# Patient Record
Sex: FEMALE | Race: WHITE | Employment: OTHER | URBAN - METROPOLITAN AREA
[De-identification: names, ages, dates, MRNs, and addresses within clinical notes are randomized per-mention and may not be internally consistent; named-entity substitution may affect disease eponyms.]

---

## 2023-02-19 ENCOUNTER — HOSPITAL ENCOUNTER (INPATIENT)
Age: 88
LOS: 2 days | Discharge: NURSING, REHAB OR LONG-TERM CARE | DRG: 389 | End: 2023-02-21
Attending: STUDENT IN AN ORGANIZED HEALTH CARE EDUCATION/TRAINING PROGRAM | Admitting: STUDENT IN AN ORGANIZED HEALTH CARE EDUCATION/TRAINING PROGRAM
Payer: MEDICARE

## 2023-02-19 ENCOUNTER — APPOINTMENT (OUTPATIENT)
Dept: CT IMAGING | Age: 88
DRG: 389 | End: 2023-02-19
Payer: MEDICARE

## 2023-02-19 DIAGNOSIS — K59.00 CONSTIPATION, UNSPECIFIED CONSTIPATION TYPE: ICD-10-CM

## 2023-02-19 DIAGNOSIS — R33.9 URINARY RETENTION: ICD-10-CM

## 2023-02-19 DIAGNOSIS — N17.9 AKI (ACUTE KIDNEY INJURY) (HCC): Primary | ICD-10-CM

## 2023-02-19 LAB
A/G RATIO: 1 (ref 1.1–2.2)
ALBUMIN SERPL-MCNC: 3.4 G/DL (ref 3.4–5)
ALP BLD-CCNC: 95 U/L (ref 40–129)
ALT SERPL-CCNC: 31 U/L (ref 10–40)
ANION GAP SERPL CALCULATED.3IONS-SCNC: 11 MMOL/L (ref 3–16)
AST SERPL-CCNC: 22 U/L (ref 15–37)
BASOPHILS ABSOLUTE: 0 K/UL (ref 0–0.2)
BASOPHILS RELATIVE PERCENT: 0.4 %
BILIRUB SERPL-MCNC: 0.4 MG/DL (ref 0–1)
BILIRUBIN URINE: NEGATIVE
BLOOD, URINE: NEGATIVE
BUN BLDV-MCNC: 41 MG/DL (ref 7–20)
CALCIUM SERPL-MCNC: 9 MG/DL (ref 8.3–10.6)
CHLORIDE BLD-SCNC: 100 MMOL/L (ref 99–110)
CLARITY: CLEAR
CO2: 26 MMOL/L (ref 21–32)
COLOR: YELLOW
CREAT SERPL-MCNC: 2 MG/DL (ref 0.6–1.2)
EOSINOPHILS ABSOLUTE: 0.1 K/UL (ref 0–0.6)
EOSINOPHILS RELATIVE PERCENT: 0.6 %
GFR SERPL CREATININE-BSD FRML MDRD: 24 ML/MIN/{1.73_M2}
GLUCOSE BLD-MCNC: 145 MG/DL (ref 70–99)
GLUCOSE URINE: NEGATIVE MG/DL
HCT VFR BLD CALC: 31.5 % (ref 36–48)
HEMOGLOBIN: 10.6 G/DL (ref 12–16)
KETONES, URINE: NEGATIVE MG/DL
LACTIC ACID: 1.3 MMOL/L (ref 0.4–2)
LEUKOCYTE ESTERASE, URINE: NEGATIVE
LYMPHOCYTES ABSOLUTE: 1 K/UL (ref 1–5.1)
LYMPHOCYTES RELATIVE PERCENT: 9.5 %
MCH RBC QN AUTO: 30.2 PG (ref 26–34)
MCHC RBC AUTO-ENTMCNC: 33.7 G/DL (ref 31–36)
MCV RBC AUTO: 89.8 FL (ref 80–100)
MICROSCOPIC EXAMINATION: NORMAL
MONOCYTES ABSOLUTE: 1.1 K/UL (ref 0–1.3)
MONOCYTES RELATIVE PERCENT: 10.8 %
NEUTROPHILS ABSOLUTE: 8.1 K/UL (ref 1.7–7.7)
NEUTROPHILS RELATIVE PERCENT: 78.7 %
NITRITE, URINE: NEGATIVE
PDW BLD-RTO: 14.9 % (ref 12.4–15.4)
PH UA: 6 (ref 5–8)
PLATELET # BLD: 244 K/UL (ref 135–450)
PMV BLD AUTO: 8.1 FL (ref 5–10.5)
POTASSIUM REFLEX MAGNESIUM: 4.6 MMOL/L (ref 3.5–5.1)
PROTEIN UA: NEGATIVE MG/DL
RBC # BLD: 3.51 M/UL (ref 4–5.2)
SODIUM BLD-SCNC: 137 MMOL/L (ref 136–145)
SPECIFIC GRAVITY UA: 1.02 (ref 1–1.03)
TOTAL PROTEIN: 6.7 G/DL (ref 6.4–8.2)
URINE REFLEX TO CULTURE: NORMAL
URINE TYPE: NORMAL
UROBILINOGEN, URINE: 0.2 E.U./DL
WBC # BLD: 10.4 K/UL (ref 4–11)

## 2023-02-19 PROCEDURE — 80053 COMPREHEN METABOLIC PANEL: CPT

## 2023-02-19 PROCEDURE — 2580000003 HC RX 258: Performed by: STUDENT IN AN ORGANIZED HEALTH CARE EDUCATION/TRAINING PROGRAM

## 2023-02-19 PROCEDURE — 83605 ASSAY OF LACTIC ACID: CPT

## 2023-02-19 PROCEDURE — 2700000000 HC OXYGEN THERAPY PER DAY

## 2023-02-19 PROCEDURE — 94761 N-INVAS EAR/PLS OXIMETRY MLT: CPT

## 2023-02-19 PROCEDURE — 99285 EMERGENCY DEPT VISIT HI MDM: CPT

## 2023-02-19 PROCEDURE — 85025 COMPLETE CBC W/AUTO DIFF WBC: CPT

## 2023-02-19 PROCEDURE — 81003 URINALYSIS AUTO W/O SCOPE: CPT

## 2023-02-19 PROCEDURE — 36415 COLL VENOUS BLD VENIPUNCTURE: CPT

## 2023-02-19 PROCEDURE — 1200000000 HC SEMI PRIVATE

## 2023-02-19 PROCEDURE — 74176 CT ABD & PELVIS W/O CONTRAST: CPT

## 2023-02-19 PROCEDURE — 6370000000 HC RX 637 (ALT 250 FOR IP): Performed by: STUDENT IN AN ORGANIZED HEALTH CARE EDUCATION/TRAINING PROGRAM

## 2023-02-19 RX ORDER — SENNA AND DOCUSATE SODIUM 50; 8.6 MG/1; MG/1
2 TABLET, FILM COATED ORAL DAILY
Status: DISCONTINUED | OUTPATIENT
Start: 2023-02-19 | End: 2023-02-21 | Stop reason: HOSPADM

## 2023-02-19 RX ORDER — LANOLIN ALCOHOL/MO/W.PET/CERES
3 CREAM (GRAM) TOPICAL NIGHTLY
Status: DISCONTINUED | OUTPATIENT
Start: 2023-02-19 | End: 2023-02-21 | Stop reason: HOSPADM

## 2023-02-19 RX ORDER — SENNOSIDES 8.6 MG
1 TABLET ORAL DAILY PRN
COMMUNITY

## 2023-02-19 RX ORDER — NYSTATIN 100000 [USP'U]/G
POWDER TOPICAL 2 TIMES DAILY PRN
COMMUNITY

## 2023-02-19 RX ORDER — SODIUM CHLORIDE 0.9 % (FLUSH) 0.9 %
5-40 SYRINGE (ML) INJECTION EVERY 12 HOURS SCHEDULED
Status: DISCONTINUED | OUTPATIENT
Start: 2023-02-19 | End: 2023-02-21 | Stop reason: HOSPADM

## 2023-02-19 RX ORDER — FUROSEMIDE 40 MG/1
40 TABLET ORAL DAILY
COMMUNITY

## 2023-02-19 RX ORDER — SODIUM CHLORIDE 9 MG/ML
INJECTION, SOLUTION INTRAVENOUS PRN
Status: DISCONTINUED | OUTPATIENT
Start: 2023-02-19 | End: 2023-02-21 | Stop reason: HOSPADM

## 2023-02-19 RX ORDER — EZETIMIBE 10 MG/1
10 TABLET ORAL DAILY
Status: DISCONTINUED | OUTPATIENT
Start: 2023-02-20 | End: 2023-02-21 | Stop reason: HOSPADM

## 2023-02-19 RX ORDER — LISINOPRIL 40 MG/1
40 TABLET ORAL DAILY
Status: CANCELLED | OUTPATIENT
Start: 2023-02-19

## 2023-02-19 RX ORDER — ONDANSETRON 4 MG/1
4 TABLET, ORALLY DISINTEGRATING ORAL EVERY 8 HOURS PRN
Status: DISCONTINUED | OUTPATIENT
Start: 2023-02-19 | End: 2023-02-21 | Stop reason: HOSPADM

## 2023-02-19 RX ORDER — SODIUM CHLORIDE 0.9 % (FLUSH) 0.9 %
5-40 SYRINGE (ML) INJECTION PRN
Status: DISCONTINUED | OUTPATIENT
Start: 2023-02-19 | End: 2023-02-21 | Stop reason: HOSPADM

## 2023-02-19 RX ORDER — LISINOPRIL 40 MG/1
40 TABLET ORAL DAILY
COMMUNITY

## 2023-02-19 RX ORDER — SERTRALINE HYDROCHLORIDE 25 MG/1
25 TABLET, FILM COATED ORAL DAILY
COMMUNITY

## 2023-02-19 RX ORDER — LORAZEPAM 0.5 MG/1
0.5 TABLET ORAL EVERY 8 HOURS PRN
COMMUNITY

## 2023-02-19 RX ORDER — LEVOTHYROXINE SODIUM 0.05 MG/1
50 TABLET ORAL DAILY
COMMUNITY

## 2023-02-19 RX ORDER — HYDROXYZINE HYDROCHLORIDE 25 MG/1
25 TABLET, FILM COATED ORAL 3 TIMES DAILY PRN
COMMUNITY

## 2023-02-19 RX ORDER — LEVOTHYROXINE SODIUM 0.05 MG/1
50 TABLET ORAL DAILY
Status: DISCONTINUED | OUTPATIENT
Start: 2023-02-20 | End: 2023-02-21 | Stop reason: HOSPADM

## 2023-02-19 RX ORDER — EZETIMIBE 10 MG/1
10 TABLET ORAL DAILY
COMMUNITY

## 2023-02-19 RX ORDER — ASPIRIN 81 MG/1
81 TABLET ORAL DAILY
COMMUNITY

## 2023-02-19 RX ORDER — ENOXAPARIN SODIUM 100 MG/ML
30 INJECTION SUBCUTANEOUS DAILY
Status: DISCONTINUED | OUTPATIENT
Start: 2023-02-19 | End: 2023-02-21 | Stop reason: HOSPADM

## 2023-02-19 RX ORDER — LOPERAMIDE HYDROCHLORIDE 2 MG/1
4 CAPSULE ORAL PRN
COMMUNITY

## 2023-02-19 RX ORDER — LORAZEPAM 0.5 MG/1
0.5 TABLET ORAL EVERY 8 HOURS PRN
Status: DISCONTINUED | OUTPATIENT
Start: 2023-02-19 | End: 2023-02-21 | Stop reason: HOSPADM

## 2023-02-19 RX ORDER — ASPIRIN 81 MG/1
81 TABLET ORAL DAILY
Status: DISCONTINUED | OUTPATIENT
Start: 2023-02-20 | End: 2023-02-21 | Stop reason: HOSPADM

## 2023-02-19 RX ORDER — ACETAMINOPHEN 325 MG/1
650 TABLET ORAL EVERY 6 HOURS PRN
Status: DISCONTINUED | OUTPATIENT
Start: 2023-02-19 | End: 2023-02-21 | Stop reason: HOSPADM

## 2023-02-19 RX ORDER — ONDANSETRON 4 MG/1
4 TABLET, FILM COATED ORAL EVERY 8 HOURS PRN
COMMUNITY

## 2023-02-19 RX ORDER — ACETAMINOPHEN 650 MG/1
650 SUPPOSITORY RECTAL EVERY 6 HOURS PRN
Status: DISCONTINUED | OUTPATIENT
Start: 2023-02-19 | End: 2023-02-21 | Stop reason: HOSPADM

## 2023-02-19 RX ORDER — CARVEDILOL 25 MG/1
25 TABLET ORAL 2 TIMES DAILY WITH MEALS
COMMUNITY

## 2023-02-19 RX ORDER — CARVEDILOL 25 MG/1
25 TABLET ORAL 2 TIMES DAILY WITH MEALS
Status: DISCONTINUED | OUTPATIENT
Start: 2023-02-19 | End: 2023-02-21 | Stop reason: HOSPADM

## 2023-02-19 RX ORDER — SODIUM PHOSPHATE, DIBASIC AND SODIUM PHOSPHATE, MONOBASIC 7; 19 G/133ML; G/133ML
1 ENEMA RECTAL PRN
COMMUNITY

## 2023-02-19 RX ORDER — NITROGLYCERIN 0.4 MG/1
0.4 TABLET SUBLINGUAL EVERY 5 MIN PRN
COMMUNITY

## 2023-02-19 RX ORDER — SERTRALINE HYDROCHLORIDE 25 MG/1
25 TABLET, FILM COATED ORAL DAILY
Status: DISCONTINUED | OUTPATIENT
Start: 2023-02-20 | End: 2023-02-21 | Stop reason: HOSPADM

## 2023-02-19 RX ORDER — IRON POLYSACCHARIDE COMPLEX 150 MG
150 CAPSULE ORAL DAILY
Status: DISCONTINUED | OUTPATIENT
Start: 2023-02-20 | End: 2023-02-21 | Stop reason: HOSPADM

## 2023-02-19 RX ORDER — NYSTATIN 100000 U/G
CREAM TOPICAL PRN
COMMUNITY

## 2023-02-19 RX ORDER — MAG HYDROX/ALUMINUM HYD/SIMETH 400-400-40
1 SUSPENSION, ORAL (FINAL DOSE FORM) ORAL PRN
COMMUNITY

## 2023-02-19 RX ORDER — POLYETHYLENE GLYCOL 3350 17 G/17G
17 POWDER, FOR SOLUTION ORAL EVERY OTHER DAY
COMMUNITY

## 2023-02-19 RX ORDER — POLYETHYLENE GLYCOL 3350 17 G/17G
17 POWDER, FOR SOLUTION ORAL 2 TIMES DAILY
Status: DISCONTINUED | OUTPATIENT
Start: 2023-02-19 | End: 2023-02-21 | Stop reason: HOSPADM

## 2023-02-19 RX ORDER — MELOXICAM 7.5 MG/1
7.5 TABLET ORAL DAILY
Status: ON HOLD | COMMUNITY
End: 2023-02-21 | Stop reason: HOSPADM

## 2023-02-19 RX ORDER — SODIUM CHLORIDE, SODIUM LACTATE, POTASSIUM CHLORIDE, AND CALCIUM CHLORIDE .6; .31; .03; .02 G/100ML; G/100ML; G/100ML; G/100ML
500 INJECTION, SOLUTION INTRAVENOUS ONCE
Status: COMPLETED | OUTPATIENT
Start: 2023-02-19 | End: 2023-02-19

## 2023-02-19 RX ORDER — BISACODYL 10 MG
10 SUPPOSITORY, RECTAL RECTAL DAILY PRN
Status: DISCONTINUED | OUTPATIENT
Start: 2023-02-19 | End: 2023-02-21 | Stop reason: HOSPADM

## 2023-02-19 RX ORDER — SPIRONOLACTONE 25 MG/1
12.5 TABLET ORAL DAILY
COMMUNITY

## 2023-02-19 RX ORDER — LANOLIN ALCOHOL/MO/W.PET/CERES
3 CREAM (GRAM) TOPICAL NIGHTLY
COMMUNITY

## 2023-02-19 RX ORDER — ONDANSETRON 2 MG/ML
4 INJECTION INTRAMUSCULAR; INTRAVENOUS EVERY 6 HOURS PRN
Status: DISCONTINUED | OUTPATIENT
Start: 2023-02-19 | End: 2023-02-21 | Stop reason: HOSPADM

## 2023-02-19 RX ORDER — IRON POLYSACCHARIDE COMPLEX 150 MG
150 CAPSULE ORAL DAILY
COMMUNITY

## 2023-02-19 RX ORDER — TRIAMCINOLONE ACETONIDE 1 MG/G
CREAM TOPICAL 2 TIMES DAILY PRN
COMMUNITY

## 2023-02-19 RX ADMIN — CARVEDILOL 25 MG: 25 TABLET, FILM COATED ORAL at 21:18

## 2023-02-19 RX ADMIN — SODIUM CHLORIDE, POTASSIUM CHLORIDE, SODIUM LACTATE AND CALCIUM CHLORIDE 500 ML: 600; 310; 30; 20 INJECTION, SOLUTION INTRAVENOUS at 17:53

## 2023-02-19 RX ADMIN — ACETAMINOPHEN 650 MG: 325 TABLET ORAL at 21:18

## 2023-02-19 RX ADMIN — Medication 3 MG: at 21:18

## 2023-02-19 ASSESSMENT — LIFESTYLE VARIABLES
HOW MANY STANDARD DRINKS CONTAINING ALCOHOL DO YOU HAVE ON A TYPICAL DAY: PATIENT DOES NOT DRINK
HOW OFTEN DO YOU HAVE A DRINK CONTAINING ALCOHOL: NEVER

## 2023-02-19 ASSESSMENT — ENCOUNTER SYMPTOMS
VOMITING: 0
DIARRHEA: 0
COUGH: 0
SHORTNESS OF BREATH: 0
SORE THROAT: 0
ABDOMINAL PAIN: 1
NAUSEA: 0
RECTAL PAIN: 1
CONSTIPATION: 1

## 2023-02-19 ASSESSMENT — PAIN DESCRIPTION - LOCATION: LOCATION: BUTTOCKS

## 2023-02-19 ASSESSMENT — PAIN SCALES - GENERAL: PAINLEVEL_OUTOF10: 2

## 2023-02-19 ASSESSMENT — PAIN - FUNCTIONAL ASSESSMENT: PAIN_FUNCTIONAL_ASSESSMENT: NONE - DENIES PAIN

## 2023-02-19 NOTE — H&P
Hospital Medicine History & Physical      PCP: Sarah Kerns MD    Date of Admission: 2/19/2023    Date of Service: Pt seen/examined on 02/19/2023 and Admitted to Inpatient with expected LOS greater than two midnights due to medical therapy. Chief Complaint:  Constipation      History Of Present Illness:      81 y.o. female w/ PMH of dementia, HTN resides at [address] [place] [address] who presented to [facility] with constipation. Per conversation with ED staff her [facility] reports she has not had BM in several days. Now complaining of pain in lower abdomen/back region and rectal pressure. Having small amount of liquid BM only for last few days. She denies any chest pain, trouble breathing, nausea, vomiting, fevers, chills or urinary symptoms. In the ED she was hypertensive to 180s/60s other vitals stable. Abdomen mildly tender but soft. Labs primarily remarkable for Cr of 2 (unknown baseline). Straight cath obtained for urine w/ ~600 cc output: no sign of UTI. CT abdomen obtained with no acute obstruction but does show concern for possible rectal impaction. Given IVF, soap-suds enema and admitted for further care. Past Medical History:          Diagnosis Date    Anxiety     Dementia ([facility] [address]     Hypertension     Insomnia     Osteoarthritis        Past Surgical History:      No past surgical history on file. Medications Prior to Admission:      Prior to Admission medications    Not on File       Allergies:  Escitalopram oxalate, Gabapentin, and Statins    Social History:      The patient currently lives at [address] [place] [address] Drive:   has no history on file for tobacco use. ETOH:   has no history on file for alcohol use. Family History:       Reviewed and negative in regards to presenting illness/complaint. No family history on file. REVIEW OF SYSTEMS COMPLETED:   Pertinent positives as noted in the HPI. All other systems reviewed and negative.     PHYSICAL EXAM PERFORMED:    BP (!) 180/61   Pulse 73   Temp 98.1 °F (36.7 °C) (Oral)   Resp 16   Ht 5' 1\" (1.549 m)   Wt 136 lb 6.4 oz (61.9 kg)   SpO2 94%   BMI 25.77 kg/m²     General appearance:  Laying in bed, awake, conversant, anxious but not ill-appearing  HEENT:  Normal cephalic, atraumatic without obvious deformity. Pupils equal, round, and reactive to light. Extra ocular muscles intact. Conjunctivae/corneas clear. Neck: Supple, with full range of motion. No jugular venous distention. Trachea midline. Respiratory:  Normal respiratory effort. Clear to auscultation, bilaterally without Rales/Wheezes/Rhonchi. Cardiovascular:  Regular rate and rhythm with normal S1/S2 without murmurs, rubs or gallops. Abdomen: Full but soft, no focal tenderness, bowel sound present  Musculoskeletal:  No clubbing, cyanosis or edema bilaterally. Full range of motion without deformity. Skin: Skin color, texture, turgor normal.  No rashes or lesions. Neurologic:  Neurovascularly intact without any focal sensory/motor deficits. Cranial nerves: II-XII intact, grossly non-focal.  Psychiatric:  Alert and oriented, thought content appropriate, normal insight  Capillary Refill: Brisk,3 seconds, normal  Peripheral Pulses: +2 palpable, equal bilaterally       Labs:     Recent Labs     02/19/23  1529   WBC 10.4   HGB 10.6*   HCT 31.5*        Recent Labs     02/19/23  1529      K 4.6      CO2 26   BUN 41*   CREATININE 2.0*   CALCIUM 9.0     Recent Labs     02/19/23  1529   AST 22   ALT 31   BILITOT 0.4   ALKPHOS 95     No results for input(s): INR in the last 72 hours. No results for input(s): Tyree Gubler in the last 72 hours.     Urinalysis:      Lab Results   Component Value Date/Time    NITRU Negative 02/19/2023 05:42 PM    WBCUA 3 12/14/2020 05:00 PM    RBCUA 2 12/14/2020 05:00 PM    BLOODU Negative 02/19/2023 05:42 PM    SPECGRAV 1.020 02/19/2023 05:42 PM    GLUCOSEU Negative 02/19/2023 05:42 PM       Radiology:       CT ABDOMEN PELVIS WO CONTRAST Additional Contrast? None   Final Result      1. No evidence of bowel obstruction. 2. Moderate rectal bowel contents which could indicate impaction. 3. Small right pleural effusion. 4. Atherosclerotic disease. US RENAL COMPLETE    (Results Pending)       Consults:    IP CONSULT TO HOSPITALIST    ASSESSMENT:    Active Hospital Problems    Diagnosis Date Noted    ESA (acute kidney injury) ([facility] [address] [N17.9] 02/19/2023     Priority: Medium   --Etiology unknown at this time but likely either pre-renal due to dehydration or obstructive 2/2 constipation and urinary retention  #Constipation w/ concern for fecal impaction  --Etiology likely 2/2 medication as patient apparently has multiple medications on home list that can cause constipation: loperamide, Atarax. Also could be 2/2 voluntary holding due to hemorrhoids  #HTN  #Dementia  #Anxiety  #Arthritis  #Hypothyroid  #Chronic diastolic heart failure not in acute exacerbation    PLAN:  - Admit to medical floor  - Telemetry monitoring  - IVF overnight  - Obtain urine studies  - Trend RFP tomorrow  - Obtain Renal ultrasound to eval for hydronephrosis  - Consider Nephro consult if Cr not improving by tomorrow  - Aggressive bowel care to aid in passage of stool burden  - Avoid opiates or other medications that can slow GI tract if possible  - Straight cath as needed if continues to have urinary retention  - Continue home medications as able: consult pharmacy for aid in home med rec  --Hold home Lasix, lisinopril and Aldactone pending Cr improvement  --Continue other home meds as able: Coreg, ASA, Zetia, melatonin, sertraline    DVT Prophylaxis: Lovenox  Diet: ADULT DIET; Regular  Code Status: DNR-CCA    PT/OT Eval Status: as needed    Dispo - Pending resolution of constipation and improvement in ESA. Anticipate at least 2 days. Yaneth Cooney MD    Thank you Lavaun Moritz, MD for the opportunity to be involved in this patient's care.  If you have any questions or concerns please feel free to contact me at [phone].

## 2023-02-19 NOTE — ED NOTES
ED TO INPATIENT SBAR HANDOFF    Patient Name: Nalini Camp   :  1935  81 y.o. MRN:  [id number]  Preferred Name  marcelino   ED Room #:  K32/D66-70  Family/Caregiver Present no   Restraints no   Sitter no   Sepsis Risk Score Sepsis Risk Score: 0.82    Situation  Code Status: Full Code No additional code details. Allergies: Escitalopram oxalate, Gabapentin, and Statins  Weight: Patient Vitals for the past 96 hrs (Last 3 readings):   Weight   23 1503 136 lb 6.4 oz (61.9 kg)     Arrived from: nursing home  Chief Complaint:   Chief Complaint   Patient presents with    Constipation     Pt present to ED from assisted living. Per ems staff report several days without regular BM. Concerns for obstruction. Hospital Problem/Diagnosis:  Principal Problem:    ESA (acute kidney injury) ([facility]  Resolved Problems:    * No resolved hospital problems. *    Imaging:   CT ABDOMEN PELVIS WO CONTRAST Additional Contrast? None   Final Result      1. No evidence of bowel obstruction. 2. Moderate rectal bowel contents which could indicate impaction. 3. Small right pleural effusion. 4. Atherosclerotic disease.          US RENAL COMPLETE    (Results Pending)     Abnormal labs:   Abnormal Labs Reviewed   COMPREHENSIVE METABOLIC PANEL W/ REFLEX TO MG FOR LOW K - Abnormal; Notable for the following components:       Result Value    Glucose 145 (*)     BUN 41 (*)     Creatinine 2.0 (*)     Est, Glom Filt Rate 24 (*)     Albumin/Globulin Ratio 1.0 (*)     All other components within normal limits   CBC WITH AUTO DIFFERENTIAL - Abnormal; Notable for the following components:    RBC 3.51 (*)     Hemoglobin 10.6 (*)     Hematocrit 31.5 (*)     Neutrophils Absolute 8.1 (*)     All other components within normal limits     Critical values: no     Abnormal Assessment Findings: see Notes    Background  History:   Past Medical History:   Diagnosis Date    Anxiety     Dementia ([facility] [address]     Hypertension     Insomnia Osteoarthritis        Assessment    Vitals/MEWS: MEWS Score: 1  Level of Consciousness: Alert (0)   Vitals:    02/19/23 1503 02/19/23 1830   BP: (!) 180/61 (!) 158/61   Pulse: 73 79   Resp: 16 16   Temp: 98.1 °F (36.7 °C) 98.3 °F (36.8 °C)   TempSrc: Oral Oral   SpO2: 94% 93%   Weight: 136 lb 6.4 oz (61.9 kg)    Height: 5' 1\" (1.549 m)      FiO2 (%): 93  O2 Flow Rate: O2 Device: Nasal cannula    Cardiac Rhythm:    Pain Assessment: 0 [] Verbal [] Kati Gallery Scale  Pain Scale: Pain Assessment  Pain Assessment: None - Denies Pain  Last documented pain score (0-10 scale)    Last documented pain medication administered: see mar  Mental Status: oriented and alert  NIH Score:    C-SSRS: Risk of Suicide: No Risk  Bedside swallow:    [place] Coma Scale (GCS): [place] Coma Scale  Eye Opening: Spontaneous  Best Verbal Response: Oriented  Best Motor Response: Obeys commands  [place]ail Coma Scale Score: 15  Active LDA's:   Peripheral IV 02/19/23 Left Antecubital (Active)     PO Status: Regular  Pertinent or High Risk Medications/Drips: no   If Yes, please provide details:     Pending Blood Product Administration: no     You may also review the ED PT Care Timeline found under the Summary Nursing Index tab. Recommendation    Pending orders signed and held  Plan for Discharge (if known): Additional Comments: .     If any further questions, please call Sending RN at [phone]    Electronically signed by: Electronically signed by Bryon Horton RN on 2/19/2023 at 6:43 PM      Bryon Horton RN  02/19/23 7651

## 2023-02-19 NOTE — ED PROVIDER NOTES
[facility]          ATTENDING PHYSICIAN NOTE       Date of evaluation: 2/19/2023    Chief Complaint     Constipation (Pt present to ED from assisted living. Per ems staff report several days without regular BM. Concerns for obstruction. )      History of Present Illness     Mike Khan is a 81 y.o. female with history of anxiety, HTN, dementia presenting from assisted living facility for evaluation of abdominal and rectal discomfort with decreased bowel movements with concern from facility for obstruction. Patient reports that she is having lower abdominal, back and rectal pressure and feels that she is not able to have a bowel movement but reports small amount of nonbloody diarrhea. She denies any chest pain, trouble breathing, nausea, vomiting, fevers, chills or urinary symptoms. Review of Systems     Review of Systems   Constitutional:  Negative for chills and fever. HENT:  Negative for congestion and sore throat. Eyes:  Negative for visual disturbance. Respiratory:  Negative for cough and shortness of breath. Cardiovascular:  Negative for chest pain. Gastrointestinal:  Positive for abdominal pain, constipation and rectal pain. Negative for diarrhea, nausea and vomiting. Genitourinary:  Negative for dysuria and frequency. Musculoskeletal:  Negative for arthralgias and myalgias. Skin:  Negative for rash. Neurological:  Negative for syncope. Psychiatric/Behavioral:  Negative for agitation. Past Medical, Surgical, Family, and Social History     She has a past medical history of Anxiety, Dementia ([address] Hypertension, Insomnia, and Osteoarthritis. She has no past surgical history on file. Her family history is not on file.   She     Medications     Previous Medications    ASPIRIN 81 MG EC TABLET    Take 81 mg by mouth daily    BISACODYL (DULCOLAX) 5 MG EC TABLET    Take 5 mg by mouth daily as needed for Constipation    CALCIUM CARBONATE-VITAMIN D (OYSTER SHELL CALCIUM 500 + D PO)    Take 1 tablet by mouth in the morning and at bedtime    CARVEDILOL (COREG) 25 MG TABLET    Take 25 mg by mouth 2 times daily (with meals)    EZETIMIBE (ZETIA) 10 MG TABLET    Take 10 mg by mouth daily    FUROSEMIDE (LASIX) 40 MG TABLET    Take 40 mg by mouth daily    GLYCERIN 2 G SUPPOSITORY    Place 1 suppository rectally as needed for Constipation    HYDROCORTISONE 2.5 % CREAM    Apply topically as needed As needed to affected area for hemorrhoids    HYDROXYZINE HCL (ATARAX) 25 MG TABLET    Take 25 mg by mouth 3 times daily as needed for Itching    IRON POLYSACCHARIDES (FERREX 150) 150 MG CAPSULE    Take 150 mg by mouth daily    LEVOTHYROXINE (SYNTHROID) 50 MCG TABLET    Take 50 mcg by mouth Daily    LISINOPRIL (PRINIVIL;ZESTRIL) 40 MG TABLET    Take 40 mg by mouth daily    LOPERAMIDE (IMODIUM) 2 MG CAPSULE    Take 4 mg by mouth as needed for Diarrhea    LORAZEPAM (ATIVAN) 0.5 MG TABLET    Take 0.5 mg by mouth every 8 hours as needed for Anxiety.    MELATONIN 3 MG TABS TABLET    Take 3 mg by mouth at bedtime    MELOXICAM (MOBIC) 7.5 MG TABLET    Take 7.5 mg by mouth daily    NITROGLYCERIN (NITROSTAT) 0.4 MG SL TABLET    Place 0.4 mg under the tongue every 5 minutes as needed for Chest pain up to max of 3 total doses. If no relief after 1 dose, call 911.    NYSTATIN (MYCOSTATIN) 276891 UNIT/GM CREAM    Apply topically as needed for Dry Skin    NYSTATIN 414678 UNIT/GM POWDER    Apply topically 2 times daily as needed Twice daily as needed under breasts    ONDANSETRON (ZOFRAN) 4 MG TABLET    Take 4 mg by mouth every 8 hours as needed for Nausea or Vomiting    POLYETHYLENE GLYCOL (GLYCOLAX) 17 G PACKET    Take 17 g by mouth every other day    SENNA (SENOKOT) 8.6 MG TABS TABLET    Take 1 tablet by mouth daily as needed for Constipation    SERTRALINE (ZOLOFT) 25 MG TABLET    Take 25 mg by mouth daily    SODIUM PHOSPHATE (FLEET) 7-19 GM/118ML    Place 1 enema  rectally as needed    SPIRONOLACTONE (ALDACTONE) 25 MG TABLET    Take 12.5 mg by mouth daily    TRIAMCINOLONE (KENALOG) 0.1 % CREAM    Apply topically 2 times daily as needed Apply topically 2 times daily prn to rash       Allergies     She is allergic to escitalopram oxalate, gabapentin, and statins. Physical Exam     INITIAL VITALS: BP: (!) 180/61, Temp: 98.1 °F (36.7 °C), Heart Rate: 73, Resp: 16, SpO2: 94 %   Physical Exam  GENERAL: Awake, alert. No acute distress. HEENT: Normocephalic, atraumatic. Conjunctiva clear, EOMI, no eye drainage. External ear normal. Nares patent with no drainage. Mucous membranes moist. Neck is supple, with full ROM. CARDIOVASCULAR: RRR, extremities warm and appear well-perfused. No peripheral edema  RESPIRATORY: No increased WOB, good air movement, breath sounds CTAB, no wheezes, rhonchi, or crackles noted. GI/ABDOMEN: Soft, non-distended, mild tenderness to lower abdomen, hyperactive bowel sounds, no rebound, no guarding. MSK/EXTR: No deformities or cyanosis noted. SKIN: Warm and dry, no rashes. NEURO: No focal neurologic deficits, moving all four extremities. PSYCH: Normal affect, answers questions appropriately. Diagnostic Results     EKG       RADIOLOGY:  CT ABDOMEN PELVIS WO CONTRAST Additional Contrast? None   Final Result      1. No evidence of bowel obstruction. 2. Moderate rectal bowel contents which could indicate impaction. 3. Small right pleural effusion. 4. Atherosclerotic disease.          US RENAL COMPLETE    (Results Pending)       LABS:   Results for orders placed or performed during the hospital encounter of 02/19/23   CMP w/ Reflex to MG   Result Value Ref Range    Sodium 137 136 - 145 mmol/L    Potassium reflex Magnesium 4.6 3.5 - 5.1 mmol/L    Chloride 100 99 - 110 mmol/L    CO2 26 21 - 32 mmol/L    Anion Gap 11 3 - 16    Glucose 145 (H) 70 - 99 mg/dL    BUN 41 (H) 7 - 20 mg/dL    Creatinine 2.0 (H) 0.6 - 1.2 mg/dL    Est, Glom Filt Rate 24 (A) >60    Calcium 9.0 8.3 - 10.6 mg/dL    Total Protein 6.7 6.4 - 8.2 g/dL    Albumin 3.4 3.4 - 5.0 g/dL    Albumin/Globulin Ratio 1.0 (L) 1.1 - 2.2    Total Bilirubin 0.4 0.0 - 1.0 mg/dL    Alkaline Phosphatase 95 40 - 129 U/L    ALT 31 10 - 40 U/L    AST 22 15 - 37 U/L   CBC with Auto Differential   Result Value Ref Range    WBC 10.4 4.0 - 11.0 K/uL    RBC 3.51 (L) 4.00 - 5.20 M/uL    Hemoglobin 10.6 (L) 12.0 - 16.0 g/dL    Hematocrit 31.5 (L) 36.0 - 48.0 %    MCV 89.8 80.0 - 100.0 fL    MCH 30.2 26.0 - 34.0 pg    MCHC 33.7 31.0 - 36.0 g/dL    RDW 14.9 12.4 - 15.4 %    Platelets 827 787 - 745 K/uL    MPV 8.1 5.0 - 10.5 fL    Neutrophils % 78.7 %    Lymphocytes % 9.5 %    Monocytes % 10.8 %    Eosinophils % 0.6 %    Basophils % 0.4 %    Neutrophils Absolute 8.1 (H) 1.7 - 7.7 K/uL    Lymphocytes Absolute 1.0 1.0 - 5.1 K/uL    Monocytes Absolute 1.1 0.0 - 1.3 K/uL    Eosinophils Absolute 0.1 0.0 - 0.6 K/uL    Basophils Absolute 0.0 0.0 - 0.2 K/uL   Lactic Acid   Result Value Ref Range    Lactic Acid 1.3 0.4 - 2.0 mmol/L   Urinalysis with Reflex to Culture    Specimen: Urine   Result Value Ref Range    Color, UA Yellow Straw/Yellow    Clarity, UA Clear Clear    Glucose, Ur Negative Negative mg/dL    Bilirubin Urine Negative Negative    Ketones, Urine Negative Negative mg/dL    Specific Gravity, UA 1.020 1.005 - 1.030    Blood, Urine Negative Negative    pH, UA 6.0 5.0 - 8.0    Protein, UA Negative Negative mg/dL    Urobilinogen, Urine 0.2 <2.0 E.U./dL    Nitrite, Urine Negative Negative    Leukocyte Esterase, Urine Negative Negative    Microscopic Examination Not Indicated     Urine Type NotGiven     Urine Reflex to Culture Not Indicated        ED BEDSIDE ULTRASOUND:  No results found.     RECENT VITALS:  BP: (!) 158/61,Temp: 98.3 °F (36.8 °C), Heart Rate: 79, Resp: 16, SpO2: 93 %     Procedures         ED Course     Nursing Notes, Past Medical Hx, Past Surgical Hx, Social Hx,Allergies, and Family Hx were reviewed. patient was given the following medications:  Orders Placed This Encounter   Medications    lactated ringers bolus    sodium chloride flush 0.9 % injection 5-40 mL    sodium chloride flush 0.9 % injection 5-40 mL    0.9 % sodium chloride infusion    enoxaparin Sodium (LOVENOX) injection 30 mg     Order Specific Question:   Indication of Use     Answer:   Prophylaxis-DVT/PE    OR Linked Order Group     ondansetron (ZOFRAN-ODT) disintegrating tablet 4 mg     ondansetron (ZOFRAN) injection 4 mg    OR Linked Order Group     acetaminophen (TYLENOL) tablet 650 mg     acetaminophen (TYLENOL) suppository 650 mg    polyethylene glycol (GLYCOLAX) packet 17 g    sennosides-docusate sodium (SENOKOT-S) 8.6-50 MG tablet 2 tablet    bisacodyl (DULCOLAX) suppository 10 mg    SMOG Enema       CONSULTS:  IP CONSULT TO HOSPITALIST  IP CONSULT TO PHARMACY    MEDICAL DECISIONMAKING / ASSESSMENT / Anita Maria Alejandra is a 81 y.o. female with history of anxiety, HTN, dementia presenting from assisted living facility with concern for decreased bowel movement and obstruction with patient reporting lower back and rectal pain. Patient well-appearing seated on stretcher in no acute distress. Differential concerning for but not limited to bowel obstruction, constipation, urinary tract infection, diverticulitis. Labs with no leukocytosis with anemia to 10.6 and creatinine of 2.0 with GFR of 24 with unknown baseline though no documented history of ESA or CKD. Lactate 1.3. Patient was unable to produce urine sample and was straight cath with large volume of urine with concern for urinary retention as possible source of elevated creatinine in setting of severe constipation. Given elevated creatinine with reduced GFR, noncontrasted CT of the abdomen and pelvis was ordered and showed large amount of stool in rectum with no evidence of obstruction. Patient was given fluid bolus for ESA. Soapsuds enema was ordered. Patient was admitted to medicine service for further evaluation and management of ESA and constipation. Patient care was signed out to inpatient team.      Medical Decision Making  Problems Addressed:  Constipation, unspecified constipation type: acute illness or injury  Urinary retention: acute illness or injury    Amount and/or Complexity of Data Reviewed  Labs: ordered. Radiology: ordered. Risk  Decision regarding hospitalization. Clinical Impression     1. ESA (acute kidney injury) ([facility] [address]    2. Constipation, unspecified constipation type    3. Urinary retention        Disposition     PATIENT REFERRED TO:  No follow-up provider specified.     DISCHARGE MEDICATIONS:  New Prescriptions    No medications on file       DISPOSITION Admitted 02/19/2023 06:05:52 PM          Abbie Stark MD  02/20/23 6473

## 2023-02-20 ENCOUNTER — APPOINTMENT (OUTPATIENT)
Dept: ULTRASOUND IMAGING | Age: 88
DRG: 389 | End: 2023-02-20
Payer: MEDICARE

## 2023-02-20 LAB
ALBUMIN SERPL-MCNC: 3.1 G/DL (ref 3.4–5)
ANION GAP SERPL CALCULATED.3IONS-SCNC: 11 MMOL/L (ref 3–16)
BASOPHILS ABSOLUTE: 0 K/UL (ref 0–0.2)
BASOPHILS RELATIVE PERCENT: 0.3 %
BUN BLDV-MCNC: 44 MG/DL (ref 7–20)
CALCIUM SERPL-MCNC: 8.6 MG/DL (ref 8.3–10.6)
CHLORIDE BLD-SCNC: 101 MMOL/L (ref 99–110)
CO2: 27 MMOL/L (ref 21–32)
CREAT SERPL-MCNC: 2.2 MG/DL (ref 0.6–1.2)
CREATININE URINE: 95.3 MG/DL (ref 28–259)
EOSINOPHILS ABSOLUTE: 0.1 K/UL (ref 0–0.6)
EOSINOPHILS RELATIVE PERCENT: 1.1 %
GFR SERPL CREATININE-BSD FRML MDRD: 21 ML/MIN/{1.73_M2}
GLUCOSE BLD-MCNC: 102 MG/DL (ref 70–99)
HCT VFR BLD CALC: 29.7 % (ref 36–48)
HEMOGLOBIN: 9.7 G/DL (ref 12–16)
LYMPHOCYTES ABSOLUTE: 1.2 K/UL (ref 1–5.1)
LYMPHOCYTES RELATIVE PERCENT: 14.6 %
MCH RBC QN AUTO: 29.4 PG (ref 26–34)
MCHC RBC AUTO-ENTMCNC: 32.6 G/DL (ref 31–36)
MCV RBC AUTO: 90.2 FL (ref 80–100)
MONOCYTES ABSOLUTE: 0.9 K/UL (ref 0–1.3)
MONOCYTES RELATIVE PERCENT: 11.1 %
NEUTROPHILS ABSOLUTE: 5.9 K/UL (ref 1.7–7.7)
NEUTROPHILS RELATIVE PERCENT: 72.9 %
PDW BLD-RTO: 14.6 % (ref 12.4–15.4)
PHOSPHORUS: 4.8 MG/DL (ref 2.5–4.9)
PLATELET # BLD: 208 K/UL (ref 135–450)
PMV BLD AUTO: 8.2 FL (ref 5–10.5)
POTASSIUM SERPL-SCNC: 4.5 MMOL/L (ref 3.5–5.1)
RBC # BLD: 3.29 M/UL (ref 4–5.2)
SODIUM BLD-SCNC: 139 MMOL/L (ref 136–145)
SODIUM URINE: 75 MMOL/L
WBC # BLD: 8.1 K/UL (ref 4–11)

## 2023-02-20 PROCEDURE — 6370000000 HC RX 637 (ALT 250 FOR IP): Performed by: INTERNAL MEDICINE

## 2023-02-20 PROCEDURE — 6360000002 HC RX W HCPCS: Performed by: STUDENT IN AN ORGANIZED HEALTH CARE EDUCATION/TRAINING PROGRAM

## 2023-02-20 PROCEDURE — 2580000003 HC RX 258: Performed by: INTERNAL MEDICINE

## 2023-02-20 PROCEDURE — 51798 US URINE CAPACITY MEASURE: CPT

## 2023-02-20 PROCEDURE — 2580000003 HC RX 258: Performed by: STUDENT IN AN ORGANIZED HEALTH CARE EDUCATION/TRAINING PROGRAM

## 2023-02-20 PROCEDURE — 2500000003 HC RX 250 WO HCPCS: Performed by: STUDENT IN AN ORGANIZED HEALTH CARE EDUCATION/TRAINING PROGRAM

## 2023-02-20 PROCEDURE — 6370000000 HC RX 637 (ALT 250 FOR IP): Performed by: STUDENT IN AN ORGANIZED HEALTH CARE EDUCATION/TRAINING PROGRAM

## 2023-02-20 PROCEDURE — 1200000000 HC SEMI PRIVATE

## 2023-02-20 PROCEDURE — 84300 ASSAY OF URINE SODIUM: CPT

## 2023-02-20 PROCEDURE — 85025 COMPLETE CBC W/AUTO DIFF WBC: CPT

## 2023-02-20 PROCEDURE — 84443 ASSAY THYROID STIM HORMONE: CPT

## 2023-02-20 PROCEDURE — 51701 INSERT BLADDER CATHETER: CPT

## 2023-02-20 PROCEDURE — 80069 RENAL FUNCTION PANEL: CPT

## 2023-02-20 PROCEDURE — 36415 COLL VENOUS BLD VENIPUNCTURE: CPT

## 2023-02-20 PROCEDURE — 82570 ASSAY OF URINE CREATININE: CPT

## 2023-02-20 PROCEDURE — 76770 US EXAM ABDO BACK WALL COMP: CPT

## 2023-02-20 RX ORDER — GAUZE BANDAGE 2" X 2"
100 BANDAGE TOPICAL DAILY
Status: DISCONTINUED | OUTPATIENT
Start: 2023-02-20 | End: 2023-02-21 | Stop reason: HOSPADM

## 2023-02-20 RX ORDER — SODIUM CHLORIDE, SODIUM LACTATE, POTASSIUM CHLORIDE, CALCIUM CHLORIDE 600; 310; 30; 20 MG/100ML; MG/100ML; MG/100ML; MG/100ML
INJECTION, SOLUTION INTRAVENOUS CONTINUOUS
Status: ACTIVE | OUTPATIENT
Start: 2023-02-20 | End: 2023-02-21

## 2023-02-20 RX ADMIN — MICONAZOLE NITRATE: 2 POWDER TOPICAL at 02:54

## 2023-02-20 RX ADMIN — ENOXAPARIN SODIUM 30 MG: 100 INJECTION SUBCUTANEOUS at 17:44

## 2023-02-20 RX ADMIN — POLYETHYLENE GLYCOL 3350 17 G: 17 POWDER, FOR SOLUTION ORAL at 21:47

## 2023-02-20 RX ADMIN — Medication 10 MG: at 23:23

## 2023-02-20 RX ADMIN — SODIUM CHLORIDE, POTASSIUM CHLORIDE, SODIUM LACTATE AND CALCIUM CHLORIDE: 600; 310; 30; 20 INJECTION, SOLUTION INTRAVENOUS at 13:34

## 2023-02-20 RX ADMIN — CARVEDILOL 25 MG: 25 TABLET, FILM COATED ORAL at 17:43

## 2023-02-20 RX ADMIN — LORAZEPAM 0.5 MG: 0.5 TABLET ORAL at 23:23

## 2023-02-20 RX ADMIN — SERTRALINE HYDROCHLORIDE 25 MG: 25 TABLET ORAL at 07:58

## 2023-02-20 RX ADMIN — Medication 10 ML: at 21:47

## 2023-02-20 RX ADMIN — MICONAZOLE NITRATE: 2 POWDER TOPICAL at 21:47

## 2023-02-20 RX ADMIN — Medication 5 ML: at 09:00

## 2023-02-20 RX ADMIN — ACETAMINOPHEN 650 MG: 325 TABLET ORAL at 15:36

## 2023-02-20 RX ADMIN — Medication 10 ML: at 05:05

## 2023-02-20 RX ADMIN — POLYETHYLENE GLYCOL 3350 17 G: 17 POWDER, FOR SOLUTION ORAL at 07:58

## 2023-02-20 RX ADMIN — Medication 3 MG: at 21:47

## 2023-02-20 RX ADMIN — EZETIMIBE 10 MG: 10 TABLET ORAL at 11:18

## 2023-02-20 RX ADMIN — Medication 150 MG: at 11:18

## 2023-02-20 RX ADMIN — LEVOTHYROXINE SODIUM 50 MCG: 50 TABLET ORAL at 05:38

## 2023-02-20 RX ADMIN — ASPIRIN 81 MG: 81 TABLET, COATED ORAL at 07:58

## 2023-02-20 RX ADMIN — CARVEDILOL 25 MG: 25 TABLET, FILM COATED ORAL at 07:58

## 2023-02-20 RX ADMIN — SENNOSIDES AND DOCUSATE SODIUM 2 TABLET: 50; 8.6 TABLET ORAL at 07:58

## 2023-02-20 RX ADMIN — Medication 100 MG: at 13:32

## 2023-02-20 NOTE — PROGRESS NOTES
Pt admitted to room 5311 on tele, NSR/SB. Pt has ESA and oxygen need of 2L per NC.  BP elevated initially but then back to normal limits with home meds given. Pt resting comfortably with bed alarm on as she is forgetful at times. Pts sister Antonino Strong who is also POA called this evening and gave information on pt and wants to be notified with any changes and discharge plans. CM consulted for d/c planning. Pure wick in place for urine collection and to prevent falls around toileting since pt is weak and sob with exertion at this time. Skin reviewed with 2nd nurse. Call light in reach.     Electronically signed by Peggy Yu RN on 2/20/23 at 1:59 AM EST

## 2023-02-20 NOTE — PROGRESS NOTES
Progress Note  Admit Date: 2/19/2023             CC: F/U for abdominal pain    HPI 81 y.o. female w/  dementia, HTN resides at [address] [place] [address] who presented to [facility] with constipation. intermediate reported she has not had BM in several days and started complaining of pain in lower abdomen/back region and rectal pressure. Having small amount of liquid BM only for last few days. She denies any chest pain, trouble breathing, nausea, vomiting, fevers, chills or urinary symptoms. In the ED she was hypertensive to 180s/60s other vitals stable. Abdomen reportedly mildly tender but soft. Labs primarily remarkable for Cr of 2 (unknown baseline). Straight cath obtained for urine w/ ~600 cc output: no sign of UTI. CT abdomen obtained with no acute obstruction but does show concern for possible rectal impaction. She was given IVF, soap-suds enema and admitted for further care    Interval History: No new complaints  States she feels like she will have a BM just now    Sitting up, eating lunch. No dyspnea    No fever    Review of Systems - Negative except as in HPI. Difficult and unreliable with dementia. Scheduled Medications:    sodium chloride flush  5-40 mL IntraVENous 2 times per day    enoxaparin  30 mg SubCUTAneous Daily    polyethylene glycol  17 g Oral BID    sennosides-docusate sodium  2 tablet Oral Daily    aspirin  81 mg Oral Daily    carvedilol  25 mg Oral BID WC    ezetimibe  10 mg Oral Daily    levothyroxine  50 mcg Oral Daily    iron polysaccharides  150 mg Oral Daily    melatonin  3 mg Oral Nightly    miconazole   Topical BID    sertraline  25 mg Oral Daily      PRN Medications: sodium chloride flush, sodium chloride, ondansetron **OR** ondansetron, acetaminophen **OR** acetaminophen, bisacodyl, SMOG Enema, hydrocortisone, LORazepam  Diet: ADULT DIET;  Regular    Continuous Infusions:   sodium chloride         PHYSICAL EXAM:  BP (!) 128/46 Comment: informed nurse  Pulse 59   Temp 98.2 °F (36.8 °C) (Oral)   Resp 18   Ht 5' 1\" (1.549 m)   Wt 136 lb 6.4 oz (61.9 kg)   SpO2 91%   BMI 25.77 kg/m²   No results for input(s): POCGLU in the last 72 hours. Intake/Output Summary (Last 24 hours) at 2/20/2023 1242  Last data filed at 2/20/2023 0330  Gross per 24 hour   Intake 120 ml   Output 400 ml   Net -280 ml     General appearance:  Laying in bed, awake, conversant, anxious but not ill-appearing  HEENT:  Normal cephalic, atraumatic Conjunctivae/corneas clear. Neck: Supple, with full range of motion. No jugular venous distention. Respiratory:  Normal respiratory effort. Clear to auscultation, bilaterally without Rales/Wheezes/Rhonchi. Cardiovascular:  Regular rate and rhythm with normal S1/S2 without murmurs, rubs or gallops. Abdomen: Full but soft, no focal tenderness, bowel sound present  Musculoskeletal:  No clubbing, cyanosis or edema bilaterally. Full range of motion without deformity. Skin: Skin color, texture, turgor normal.  No rashes or lesions. Neurologic:  grossly non-focal.  Psychiatric:  Alert and oriented, thought content appropriate, normal insight  Capillary Refill: Brisk,3 seconds, normal  Peripheral Pulses: +2 palpable, equal bilaterally     LABS:  Recent Labs     02/19/23  1529 02/20/23  0648   WBC 10.4 8.1   HGB 10.6* 9.7*   HCT 31.5* 29.7*    208                                                                    Recent Labs     02/19/23  1529 02/20/23  0648    139   K 4.6 4.5    101   CO2 26 27   BUN 41* 44*   CREATININE 2.0* 2.2*   GLUCOSE 145* 102*     Recent Labs     02/19/23  1529   AST 22   ALT 31   BILITOT 0.4   ALKPHOS 95     No results for input(s): TROPONINI in the last 72 hours. No results for input(s): BNP in the last 72 hours. No results for input(s): CHOL, HDL in the last 72 hours. Invalid input(s): LDLCALCU  No results for input(s): INR in the last 72 hours.     Assessment & Plan:    81 y.o. female w/  dementia, HTN resides at [address] [place] [address] who presented to [facility] with constipation. Probable ESA     Acute urinary retention: POA  - Possibly chronic component, likely sec to fecal impaction    Constipation with fecal impaction: POA  --Likely sec to medication as patient apparently has multiple medications on home list that can cause constipation as well as slow transit sec to age/dementia, and hemorrhoids    Chronic diastolic heart failure not in acute exacerbation    Anxiety disorder: benzodiazepine dependent: POA    #HTN  #Dementia  #Anxiety  #Arthritis  #Hypothyroidism        Unknown baseline Cr. Possible ESA, but may be CKD stage 3.     - Possible ESA likely multifactorial: pre-renal related to low oral intake and post renal sec to urinary retention    - IVF --> PO hydration. Will give some more IVF, gently  - Monitor I/o ; weights. - Trend RFP until levels out to guide stage of renal disease.     - Consider Nephro consult if Cr not improving     - Hold loperamide, Atarax and iron supplements/   - Scheduled and PRN bowel meds  - PRN enema    - Avoid opiates or other medications that can slow GI tract if possible    - Bladder scan q shift; Straight cath as needed if continues to have urinary retention; until constipation resolves. - Continue home medications as able:     --Hold home Lasix, lisinopril and Aldactone pending Cr improvement    --Continue other home meds as able: Coreg, ASA, Zetia, melatonin, sertraline    Update TSH      The patient and / or the family were informed of the results of any tests, a time was given to answer questions, a plan was proposed and they agreed with plan.     DNR-CCA    Disposition: Likely 2 days; return to LTC at discharge      Reed Brothers MD

## 2023-02-20 NOTE — PROGRESS NOTES
Pt bladder scanned and >359ml retained. Specimen needed and unable to pee. Straight cath'd patient and over 400ml of clear yellow urine drained. Urine samples sent. Pt also cleaned up for having small bm. Electronically signed by Franco Lerner RN on 2/20/23 at 4:29 AM EST.

## 2023-02-20 NOTE — CONSULTS
Pharmacy Consult Note  - Admission Medication Reconciliation      Pharmacy consulted for reconciliation of xzvox-zu-olqpmellg medications. I reviewed med list in chart from [facility]      The following changes made to ayotf-ix-uhbycdmba medication list:    No changes made to current med list, which was reviewed and completed by admission RN today       Current Outpatient Medications   Medication Instructions    aspirin 81 mg, Oral, DAILY    bisacodyl (DULCOLAX) 5 mg, Oral, DAILY PRN    Calcium Carbonate-Vitamin D (OYSTER SHELL CALCIUM 500 + D PO) 1 tablet, Oral, 2 times daily    carvedilol (COREG) 25 mg, Oral, 2 TIMES DAILY WITH MEALS    ezetimibe (ZETIA) 10 mg, Oral, DAILY    furosemide (LASIX) 40 mg, Oral, DAILY    glycerin 2 g suppository 1 suppository, Rectal, PRN    hydrocortisone 2.5 % cream Topical, PRN, As needed to affected area for hemorrhoids    hydrOXYzine HCl (ATARAX) 25 mg, Oral, 3 TIMES DAILY PRN    iron polysaccharides (FERREX 150) 150 mg, Oral, DAILY    levothyroxine (SYNTHROID) 50 mcg, Oral, DAILY    lisinopril (PRINIVIL;ZESTRIL) 40 mg, Oral, DAILY    loperamide (IMODIUM) 4 mg, Oral, PRN    LORazepam (ATIVAN) 0.5 mg, Oral, EVERY 8 HOURS PRN    melatonin 3 mg, Oral, Nightly    meloxicam (MOBIC) 7.5 mg, Oral, DAILY    nitroGLYCERIN (NITROSTAT) 0.4 mg, SubLINGual, EVERY 5 MIN PRN, up to max of 3 total doses.  If no relief after 1 dose, call 911.    nystatin (MYCOSTATIN) 458187 UNIT/GM cream Topical, PRN    nystatin 834657 UNIT/GM powder Topical, 2 TIMES DAILY PRN, Twice daily as needed under breasts    ondansetron (ZOFRAN) 4 mg, Oral, EVERY 8 HOURS PRN    polyethylene glycol (GLYCOLAX) 17 g, Oral, EVERY OTHER DAY    senna (SENOKOT) 8.6 MG TABS tablet 1 tablet, Oral, DAILY PRN    sertraline (ZOLOFT) 25 mg, Oral, DAILY    sodium phosphate (FLEET) 7-19 GM/118ML 1 enema, Rectal, PRN    spironolactone (ALDACTONE) 12.5 mg, Oral, DAILY    triamcinolone (KENALOG) 0.1 % cream Topical, 2 TIMES DAILY PRN, Apply topically 2 times daily prn to rash       Thank you for the consult    Please call with any questions    Kristin Dhaliwal. Zoe Jeffrey  [phone] ([facility])

## 2023-02-20 NOTE — CARE COORDINATION
Case Management Assessment  Initial Evaluation    Date/Time of Evaluation: 2/20/2023 12:56 PM  Assessment Completed by: Arielle Davenport RN    If patient is discharged prior to next notation, then this note serves as note for discharge by case management. Patient Name: Anthony Hazel                   YOB: 1935  Diagnosis: Urinary retention [R33.9]  ESA (acute kidney injury) ([facility] [address] [N17.9]  Constipation, unspecified constipation type [K59.00]                   Date / Time: 2/19/2023  2:59 PM    Patient Admission Status: Inpatient   Readmission Risk (Low < 19, Mod (19-27), High > 27): Readmission Risk Score: 16.5    Current PCP: Srinath Dodson MD  PCP verified by CM? Yes    Chart Reviewed: Yes      History Provided by: Medical Record  Patient Orientation: Person    Patient Cognition: Dementia / Early Alzheimer's    Hospitalization in the last 30 days (Readmission):  No    If yes, Readmission Assessment in  Navigator will be completed. {  }      Advance Directives:      Code Status: DNR-CCA   Patient's Primary Decision Maker is: Legal Next of Kin      Discharge Planning:    Patient lives with: Alone Type of Home:  [facility])  Primary Care Giver:  Other (Comment) (kirt SMITH)  Patient Support Systems include: Family Members, Home Care Staff, Other (Comment) (AL staff)   Current Financial resources:    Current community resources: Assisted Living  Current services prior to admission: Extended Care Facility            Current DME:              Type of Home Care services:  None    ADLS  Prior functional level: Assistance with the following:, Bathing, Dressing, Toileting, Feeding, Cooking, Housework, Shopping  Current functional level: Assistance with the following:, Bathing, Toileting, Dressing, Feeding, Cooking, Housework, Shopping    PT AM-PAC:   /24  OT AM-PAC:   /24    Family can provide assistance at DC: No  Would you like Case Management to discuss the discharge plan with any other family members/significant others, and if so, who? Yes  Plans to Return to Present Housing: Yes  Other Identified Issues/Barriers to RETURNING to current housing:   Potential Assistance needed at discharge: N/A            Potential DME:    Patient expects to discharge to: Assisted living  Plan for transportation at discharge: Other (see comment)    Financial    Payor: MEDICARE / Plan: MEDICARE PART A AND B / Product Type: *No Product type* /     Does insurance require precert for SNF: No    Potential assistance Purchasing Medications: No  Meds-to-Beds request:        [facility] [address] [place] [address] [phone]  [address] [address]  Phone: [phone] Fax: [phone]      Notes:    Factors facilitating achievement of predicted outcomes: Family support and Caregiver support    Barriers to discharge: Confusion, Cognitive deficit, Limited safety awareness, and Medical complications    Additional Case Management Notes: Patient Is from [facility], able to return at dc. Patient is AMS due to dementia, but her sister is primary contact. Patient continues with uro retention and needs a renal US. CM will continue to follow for dc planning. The Plan for Transition of Care is related to the following treatment goals of Urinary retention [R33.9]  ESA (acute kidney injury) ([facility] [address] [N17.9]  Constipation, unspecified constipation type [O14.76]    IF APPLICABLE: The Patient and/or patient representative Nahomi Freed and her family were provided with a choice of provider and agrees with the discharge plan. Freedom of choice list with basic dialogue that supports the patient's individualized plan of care/goals and shares the quality data associated with the providers was provided to: Patient   Patient Representative Name:       The Patient and/or Patient Representative Agree with the Discharge Plan?  Yes    Michael Grant, RN  Case Management Department  : [phone] Fax: [phone]

## 2023-02-21 VITALS
DIASTOLIC BLOOD PRESSURE: 68 MMHG | HEIGHT: 61 IN | RESPIRATION RATE: 16 BRPM | TEMPERATURE: 98.5 F | OXYGEN SATURATION: 99 % | WEIGHT: 136.4 LBS | HEART RATE: 65 BPM | BODY MASS INDEX: 25.75 KG/M2 | SYSTOLIC BLOOD PRESSURE: 150 MMHG

## 2023-02-21 LAB
ALBUMIN SERPL-MCNC: 3.1 G/DL (ref 3.4–5)
ANION GAP SERPL CALCULATED.3IONS-SCNC: 10 MMOL/L (ref 3–16)
BASOPHILS ABSOLUTE: 0 K/UL (ref 0–0.2)
BASOPHILS RELATIVE PERCENT: 0.2 %
BUN BLDV-MCNC: 42 MG/DL (ref 7–20)
CALCIUM SERPL-MCNC: 8.5 MG/DL (ref 8.3–10.6)
CHLORIDE BLD-SCNC: 100 MMOL/L (ref 99–110)
CO2: 27 MMOL/L (ref 21–32)
CREAT SERPL-MCNC: 2.2 MG/DL (ref 0.6–1.2)
EOSINOPHILS ABSOLUTE: 0.1 K/UL (ref 0–0.6)
EOSINOPHILS RELATIVE PERCENT: 1.1 %
GFR SERPL CREATININE-BSD FRML MDRD: 21 ML/MIN/{1.73_M2}
GLUCOSE BLD-MCNC: 179 MG/DL (ref 70–99)
HCT VFR BLD CALC: 31.4 % (ref 36–48)
HEMOGLOBIN: 10.2 G/DL (ref 12–16)
LYMPHOCYTES ABSOLUTE: 0.9 K/UL (ref 1–5.1)
LYMPHOCYTES RELATIVE PERCENT: 10.7 %
MCH RBC QN AUTO: 29.9 PG (ref 26–34)
MCHC RBC AUTO-ENTMCNC: 32.6 G/DL (ref 31–36)
MCV RBC AUTO: 91.6 FL (ref 80–100)
MONOCYTES ABSOLUTE: 0.6 K/UL (ref 0–1.3)
MONOCYTES RELATIVE PERCENT: 7.8 %
NEUTROPHILS ABSOLUTE: 6.4 K/UL (ref 1.7–7.7)
NEUTROPHILS RELATIVE PERCENT: 80.2 %
PDW BLD-RTO: 15.2 % (ref 12.4–15.4)
PHOSPHORUS: 3.2 MG/DL (ref 2.5–4.9)
PLATELET # BLD: 209 K/UL (ref 135–450)
PMV BLD AUTO: 8.4 FL (ref 5–10.5)
POTASSIUM SERPL-SCNC: 4.7 MMOL/L (ref 3.5–5.1)
RBC # BLD: 3.42 M/UL (ref 4–5.2)
SODIUM BLD-SCNC: 137 MMOL/L (ref 136–145)
TSH REFLEX: 1.02 UIU/ML (ref 0.27–4.2)
WBC # BLD: 8 K/UL (ref 4–11)

## 2023-02-21 PROCEDURE — 6370000000 HC RX 637 (ALT 250 FOR IP): Performed by: INTERNAL MEDICINE

## 2023-02-21 PROCEDURE — 80069 RENAL FUNCTION PANEL: CPT

## 2023-02-21 PROCEDURE — 97116 GAIT TRAINING THERAPY: CPT

## 2023-02-21 PROCEDURE — 97162 PT EVAL MOD COMPLEX 30 MIN: CPT

## 2023-02-21 PROCEDURE — 36415 COLL VENOUS BLD VENIPUNCTURE: CPT

## 2023-02-21 PROCEDURE — 97166 OT EVAL MOD COMPLEX 45 MIN: CPT

## 2023-02-21 PROCEDURE — 85025 COMPLETE CBC W/AUTO DIFF WBC: CPT

## 2023-02-21 PROCEDURE — 97535 SELF CARE MNGMENT TRAINING: CPT

## 2023-02-21 PROCEDURE — 6370000000 HC RX 637 (ALT 250 FOR IP): Performed by: STUDENT IN AN ORGANIZED HEALTH CARE EDUCATION/TRAINING PROGRAM

## 2023-02-21 PROCEDURE — 97530 THERAPEUTIC ACTIVITIES: CPT

## 2023-02-21 RX ADMIN — CARVEDILOL 25 MG: 25 TABLET, FILM COATED ORAL at 07:57

## 2023-02-21 RX ADMIN — POLYETHYLENE GLYCOL 3350 17 G: 17 POWDER, FOR SOLUTION ORAL at 07:57

## 2023-02-21 RX ADMIN — MICONAZOLE NITRATE: 2 POWDER TOPICAL at 07:58

## 2023-02-21 RX ADMIN — ASPIRIN 81 MG: 81 TABLET, COATED ORAL at 07:57

## 2023-02-21 RX ADMIN — SENNOSIDES AND DOCUSATE SODIUM 2 TABLET: 50; 8.6 TABLET ORAL at 07:57

## 2023-02-21 RX ADMIN — LEVOTHYROXINE SODIUM 50 MCG: 50 TABLET ORAL at 06:09

## 2023-02-21 RX ADMIN — Medication 100 MG: at 07:57

## 2023-02-21 RX ADMIN — SERTRALINE HYDROCHLORIDE 25 MG: 25 TABLET ORAL at 07:57

## 2023-02-21 NOTE — PLAN OF CARE
Problem: ABCDS Injury Assessment  Goal: Absence of physical injury  Outcome: Completed     Problem: Metabolic/Fluid and Electrolytes - Adult  Goal: Electrolytes maintained within normal limits  Outcome: Completed  Goal: Hemodynamic stability and optimal renal function maintained  2/21/2023 1405 by Dena James RN  Outcome: Completed  2/21/2023 0645 by Connie Dsouza RN  Outcome: Progressing     Problem: Discharge Planning  Goal: Discharge to home or other facility with appropriate resources  Outcome: Completed     Problem: Pain  Goal: Verbalizes/displays adequate comfort level or baseline comfort level  2/21/2023 1405 by Dena James RN  Outcome: Completed  2/21/2023 0645 by Connie Dsouza RN  Outcome: Progressing     Problem: Safety - Adult  Goal: Free from fall injury  2/21/2023 1405 by Dena James RN  Outcome: Completed  2/21/2023 0645 by Connie Dsouza RN  Outcome: Progressing     Problem: Skin/Tissue Integrity  Goal: Absence of new skin breakdown  Description: 1. Monitor for areas of redness and/or skin breakdown  2. Assess vascular access sites hourly  3. Every 4-6 hours minimum:  Change oxygen saturation probe site  4. Every 4-6 hours:  If on nasal continuous positive airway pressure, respiratory therapy assess nares and determine need for appliance change or resting period.   2/21/2023 1405 by Dena James RN  Outcome: Completed  2/21/2023 0645 by Connie Dsouza RN  Outcome: Progressing

## 2023-02-21 NOTE — PLAN OF CARE
Problem: Metabolic/Fluid and Electrolytes - Adult  Goal: Hemodynamic stability and optimal renal function maintained  Outcome: Progressing     Problem: Pain  Goal: Verbalizes/displays adequate comfort level or baseline comfort level  Outcome: Progressing     Problem: Safety - Adult  Goal: Free from fall injury  Outcome: Progressing     Problem: Skin/Tissue Integrity  Goal: Absence of new skin breakdown  Description: 1. Monitor for areas of redness and/or skin breakdown  2. Assess vascular access sites hourly  3. Every 4-6 hours minimum:  Change oxygen saturation probe site  4. Every 4-6 hours:  If on nasal continuous positive airway pressure, respiratory therapy assess nares and determine need for appliance change or resting period.   Outcome: Progressing - -Walks with a walker at home  -Currently holding DVT prophylaxis in the setting of anemia without identifiable source. DVT ppx: SCDs, hold pharmacologic ppx in setting of anemia  DIET: Renal   DISPO: PT eval - uses walker at home

## 2023-02-21 NOTE — CARE COORDINATION
Case Management Assessment            Discharge Note                    Date / Time of Note: 2/21/2023 1:55 PM                  Discharge Note Completed by: Shandra Ramirez RN    Patient Name: Nate Covarrubias   YOB: 1935  Diagnosis: Urinary retention [R33.9]  ESA (acute kidney injury) ([facility] [address] [N17.9]  Constipation, unspecified constipation type [K59.00]   Date / Time: 2/19/2023  2:59 PM    Current PCP: Mikael Mo MD  Clinic patient: No    Hospitalization in the last 30 days: No    Advance Directives:  Code Status: DNR-CCA  [address] DNR form completed and on chart: Yes    Financial:  Payor: MEDICARE / Plan: MEDICARE PART A AND B / Product Type: *No Product type* /      Pharmacy:    [facility], [address] [place] [address] [phone]  [address] [place], [place] [id number]  Phone: [phone] Fax: [phone]      Assistance purchasing medications?: Potential Assistance Purchasing Medications: No  Assistance provided by Case Management: None at this time    Does patient want to participate in local refill/ meds to beds program?:      Meds To Beds General Rules:  1. Can ONLY be done Monday- Friday between 8:30am-5pm  2. Prescription(s) must be in pharmacy by 3pm to be filled same day  3. Copy of patient's insurance/ prescription drug card and patient face sheet must be sent along with the prescription(s)  4. Cost of Rx cannot be added to hospital bill. If financial assistance is needed, please contact unit  or ;  or  CANNOT provide pharmacy voucher for patients co-pays  5.  Patients can then  the prescription on their way out of the hospital at discharge, or pharmacy can deliver to the bedside if staff is available. (payment due at time of pick-up or delivery - cash, check, or card accepted)     Able to afford home medications/ co-pay costs: Yes    ADLS:  Current PT AM-PAC Score: 19 /24  Current OT AM-PAC Score: 18 /24      DISCHARGE Disposition: Assisted Living Facility: [facility] Phone: [phone] Fax: [phone]    LOC at discharge: Not Applicable  NAZIA Completed: Yes    Notification completed in HENS/PAS?:  Not Applicable    IMM Completed:   Not Indicated    Transportation:  Transportation PLAN for discharge: EMS transportation   Mode of Transport: Ambulance stretcher - BLS  Reason for medical transport: Other: AMS  Name of [address] [place] [address],[address]: [facility]  Phone: [phone]  Time of Transport: [address] form completed: Yes    Home Care:  Home Care ordered at discharge: Yes  [address]: [facility]  Phone: [phone]  Fax: [phone]  Orders faxed: No Sunday Jolon at [facility] stated she will call and send over information)    Referrals made at [facility] for outpatient continued care:  Not Applicable    Additional CM Notes: patient to return to [facility] [place] by EMS transport at 1430. Call placed to [facility]. RN to call [phone]    The Plan for Transition of Care is related to the following treatment goals of Urinary retention [R33.9]  ESA (acute kidney injury) ([facility] [address] [N17.9]  Constipation, unspecified constipation type [K59.00]    The Patient and/or patient representative Nata Khoury and her family were provided with a choice of provider and agrees with the discharge plan Yes    Freedom of choice list was provided with basic dialogue that supports the patient's individualized plan of care/goals and shares the quality data associated with the providers.  Yes    Care Transitions patient: No    Pamela Mendes RN  [facility]  Case Management Department  Ph: [phone]  Fax: [phone]

## 2023-02-21 NOTE — PROGRESS NOTES
Attempted to call report to [facility] facility, had to leave  for call back. 1451: attempted to call report to [facility] again, had to leave  with call back number.      Electronically signed by Gini Patterson RN on 2/21/2023 at 2:17 PM

## 2023-02-21 NOTE — CARE COORDINATION
1:28 PM  Spoke with Matilda Ricketts this AM about pt returning to [facility]. Besty agreeable to pts return.   Would like an update on day and time when possible     Electronically signed by Jinnie Kayser, RN, CM on 2/21/2023 at 1:29 PM.  Phone: [phone]  Fax: [phone]

## 2023-02-21 NOTE — PROGRESS NOTES
Physical Therapy  Facility/Department: [address] [place] [address]  Physical Therapy Initial Assessment    Name: Shira Hilton  : 1935  MRN: [id number]  Date of Service: 2023    Discharge Recommendations:Kristi Phillips scored a 19/24 on the AM-PAC short mobility form. Current research shows that an AM-PAC score of 18 or greater is typically associated with a discharge to the patient's home setting. Based on the patient's AM-PAC score and their current functional mobility deficits, it is recommended that the patient have 2-3 sessions per week of Physical Therapy at d/c to increase the patient's independence. At this time, this patient demonstrates the endurance and safety to discharge home with (home services) and a follow up treatment frequency of 2-3x/wk. Please see assessment section for further patient specific details. If patient discharges prior to next session this note will serve as a discharge summary. Please see below for the latest assessment towards goals. PT Equipment Recommendations  Equipment Needed: No      Patient Diagnosis(es): The primary encounter diagnosis was ESA (acute kidney injury) ([address] Diagnoses of Constipation, unspecified constipation type and Urinary retention were also pertinent to this visit. Past Medical History:  has a past medical history of Anxiety, Dementia ([address] Hypertension, Insomnia, and Osteoarthritis. Past Surgical History:  has no past surgical history on file. Assessment   Assessment: 80 yo admitted 24 for urinary retention/fecal impaction. Pt demo mobility below her reported baseline of independent ambulator without AD in ALU. Pt required CGA for gait and CGA for transfers this am. Pt tearful during session just wanting to lay in bed and \"rest. \" Pt plans to [facility] to [facility] at discharge. If home, recommend 24 hour supervision initially, home PT.   Treatment Diagnosis: mobility impairment due to urinary retention  Decision Making: Medium Complexity  Requires PT Follow-Up: Yes  Activity Tolerance  Activity Tolerance: Patient limited by pain;Treatment limited secondary to decreased cognition;Patient limited by fatigue     Plan   Physcial Therapy Plan  General Plan:  (2-5)  Current Treatment Recommendations: Functional mobility training, Transfer training, Gait training, Endurance training  Safety Devices  Type of Devices: Call light within reach, Chair alarm in place, Nurse notified, Left in chair     Restrictions  Position Activity Restriction  Other position/activity restrictions: up as tolerated     Subjective   Pain: Pt reports pain in her buttocks, not rated, RN aware  General  Chart Reviewed: Yes  Patient assessed for rehabilitation services?: Yes  Additional Pertinent Hx: 81 y.o. female with history of anxiety, HTN, dementia presented to ED 2/19/23 from assisted living facility for evaluation of abdominal and rectal discomfort. CT abdomen positive impaction. Family / Caregiver Present: No  Diagnosis: urinary retention  Follows Commands: Within Functional Limits  Subjective  Subjective: Pt found supine in bed and agreeable to PT with encouragement. Pt rates rectal pain 6/10, RN aware.          Social/Functional History  Social/Functional History  Lives With: Alone  Type of Home: Assisted living  Home Layout: One level  Home Access: Elevator  Bathroom Shower/Tub: Walk-in shower  Bathroom Equipment: Grab bars in shower, Shower chair, Grab bars around toilet  Home Equipment: Caleb Rea  Has the patient had two or more falls in the past year or any fall with injury in the past year?: No  ADL Assistance: [address] [place] [address] Avenue: Needs assistance (the facility assists with that)  Ambulation Assistance: Independent  Transfer Assistance: Independent  Active : No  Occupation: Retired  Type of Occupation:   Leisure & Hobbies: exercise class,    Vision/Hearing  Vision  Vision: Impaired  Vision Exceptions: Wears glasses at all times  Hearing  Hearing: Exceptions to [facility]  Hearing Exceptions: Hard of hearing/hearing concerns      Cognition   Orientation  Overall Orientation Status: Within Functional Limits  Cognition  Overall Cognitive Status: Exceptions  Following Commands: Follows one step commands with increased time  Attention Span: Attends with cues to redirect  Memory: Decreased long term memory;Decreased short term memory  Safety Judgement: Decreased awareness of need for assistance  Problem Solving: Assistance required to generate solutions  Insights: Decreased awareness of deficits  Initiation: Requires cues for some     Objective   evice: Nasal cannula              AROM RLE (degrees)  RLE AROM: WFL  AROM LLE (degrees)  LLE AROM : WFL    Strength RLE  Strength RLE: WFL  Strength LLE  Strength LLE: WFL        Bed mobility  Supine to Sit: Supervision    Transfers  Sit to Stand: Contact guard assistance (x4 trials)  Stand to Sit: Contact guard assistance (x4 trials)    Ambulation  Device: No Device  Assistance:Contact guard assistance   Quality of Gait: forward posture with decreased step length/height; RICO on RA with sp02 92% on RA (2L left off-RN aware)  Distance: 20ft x2  Comments: Pt declined walking farther due to being anxious (tearful at times).             AM-PAC Score  AM-PAC Inpatient Mobility Raw Score : 19 (02/21/23 0925)  AM-PAC Inpatient T-Scale Score : 45.44 (02/21/23 0925)  Mobility Inpatient CMS 0-100% Score: 41.77 (02/21/23 0925)  Mobility Inpatient CMS G-Code Modifier : CK (02/21/23 0925)         Goals  Short Term Goals  Time Frame for Short Term Goals: discharge  Short Term Goal 1: sit to/from stand supervision  Short Term Goal 2: ambulate 50 ft with or without AD supervision  Patient Goals   Patient Goals : return to ALU       Education  Patient Education  Education Given To: Patient  Education Provided: Role of Therapy (importance of OOB mobility)  Education Method: Verbal  Barriers to Learning: Cognition  Education Outcome: Continued education needed      Therapy Time   Individual Concurrent Group Co-treatment   Time In 9411         Time Out 0925         Minutes 38          Timed Code Treatment Minutes: 28      Total Treatment Minutes:   1463 Kelin Huffman PT

## 2023-02-21 NOTE — CONSULTS
Clinical Pharmacy Progress Note  Medication History      Asked to verify home medications by Dr. Fabiano Ross. List of of current medications patient is taking is complete. Home Medication list in EPIC updated to reflect changes noted below. Source of information: medication list from [facility]     No changes made to home med list since RN completed on admission. Unclear when pt had last doses of PRN meds - marked as \"unknown\" last dose times, but pt has active orders for them at [facility]     Please call with questions--  Thanks--  Barbie Mesa, ElifD, BCPS, BCGP  [id number] ([facility])   2/21/2023 2:07 PM      Current Outpatient Medications   Medication Instructions    aspirin 81 mg, Oral, DAILY    bisacodyl (DULCOLAX) 5 mg, Oral, DAILY PRN    Calcium Carbonate-Vitamin D (OYSTER SHELL CALCIUM 500 + D PO) 1 tablet, Oral, 2 times daily    carvedilol (COREG) 25 mg, Oral, 2 TIMES DAILY WITH MEALS    ezetimibe (ZETIA) 10 mg, Oral, DAILY    furosemide (LASIX) 40 mg, Oral, DAILY    glycerin 2 g suppository 1 suppository, Rectal, PRN    hydrocortisone 2.5 % cream Topical, PRN, As needed to affected area for hemorrhoids    hydrOXYzine HCl (ATARAX) 25 mg, Oral, 3 TIMES DAILY PRN    iron polysaccharides (FERREX 150) 150 mg, Oral, DAILY    levothyroxine (SYNTHROID) 50 mcg, Oral, DAILY    lisinopril (PRINIVIL;ZESTRIL) 40 mg, Oral, DAILY    loperamide (IMODIUM) 4 mg, Oral, PRN    LORazepam (ATIVAN) 0.5 mg, Oral, EVERY 8 HOURS PRN    melatonin 3 mg, Oral, Nightly    nitroGLYCERIN (NITROSTAT) 0.4 mg, SubLINGual, EVERY 5 MIN PRN, up to max of 3 total doses.  If no relief after 1 dose, call 911.    nystatin (MYCOSTATIN) 834828 UNIT/GM cream Topical, PRN    nystatin 089655 UNIT/GM powder Topical, 2 TIMES DAILY PRN, Twice daily as needed under breasts    ondansetron (ZOFRAN) 4 mg, Oral, EVERY 8 HOURS PRN    polyethylene glycol (GLYCOLAX) 17 g, Oral, EVERY OTHER DAY    senna (SENOKOT) 8.6 MG TABS tablet 1 tablet, Oral, DAILY PRN    sertraline (ZOLOFT) 25 mg, Oral, DAILY    sodium phosphate (FLEET) 7-19 GM/118ML 1 enema, Rectal, PRN    spironolactone (ALDACTONE) 12.5 mg, Oral, DAILY    triamcinolone (KENALOG) 0.1 % cream Topical, 2 TIMES DAILY PRN, Apply topically 2 times daily prn to rash

## 2023-02-21 NOTE — PROGRESS NOTES
Discharge:  Patient being discharged to [facility]. SBAR report called to Ameya Sullivan @ facility. All questions answered. Patient transport per PTS.   Electronically signed by Mele Miller RN on 2/21/2023 at 3:39 PM

## 2023-02-21 NOTE — PROGRESS NOTES
Progress Note  Admit Date: 2/19/2023             CC: F/U for abdominal pain    HPI 81 y.o. female w/  dementia, HTN resides at [place] [address] who presented to [facility] with constipation. DAWSON reported she has not had BM in several days and started complaining of pain in lower abdomen/back region and rectal pressure. Having small amount of liquid BM only for last few days. She denies any chest pain, trouble breathing, nausea, vomiting, fevers, chills or urinary symptoms. In the ED she was hypertensive to 180s/60s other vitals stable. Abdomen reportedly mildly tender but soft. Labs primarily remarkable for Cr of 2 (unknown baseline). Straight cath obtained for urine w/ ~600 cc output: no sign of UTI. CT abdomen obtained with no acute obstruction but does show concern for possible rectal impaction. She was given IVF, soap-suds enema and admitted for further care      Interval History: No new complaints    States she is having good BMs now    Sitting up, eating lunch. No dyspnea    No fever    Review of Systems - Negative except as in HPI. Difficult and unreliable with dementia. Scheduled Medications:    thiamine  100 mg Oral Daily    sodium chloride flush  5-40 mL IntraVENous 2 times per day    enoxaparin  30 mg SubCUTAneous Daily    polyethylene glycol  17 g Oral BID    sennosides-docusate sodium  2 tablet Oral Daily    aspirin  81 mg Oral Daily    carvedilol  25 mg Oral BID WC    [Held by provider] ezetimibe  10 mg Oral Daily    levothyroxine  50 mcg Oral Daily    [Held by provider] iron polysaccharides  150 mg Oral Daily    melatonin  3 mg Oral Nightly    miconazole   Topical BID    sertraline  25 mg Oral Daily      PRN Medications: sodium chloride flush, sodium chloride, ondansetron **OR** ondansetron, acetaminophen **OR** acetaminophen, bisacodyl, hydrocortisone, LORazepam  Diet: ADULT DIET;  Regular    Continuous Infusions:   sodium chloride         PHYSICAL EXAM:  BP (!) 150/68   Pulse 65   Temp 98.5 °F (36.9 °C) (Oral)   Resp 16   Ht 5' 1\" (1.549 m)   Wt 136 lb 6.4 oz (61.9 kg)   SpO2 99%   BMI 25.77 kg/m²   No results for input(s): POCGLU in the last 72 hours. Intake/Output Summary (Last 24 hours) at 2/21/2023 1009  Last data filed at 2/21/2023 0800  Gross per 24 hour   Intake 232 ml   Output --   Net 232 ml       General appearance:  Laying in bed, awake, conversant, anxious but not ill-appearing  HEENT:  Normal cephalic, atraumatic Conjunctivae/corneas clear. Neck: Supple, with full range of motion. No jugular venous distention. Respiratory:  Normal respiratory effort. Clear to auscultation, bilaterally without Rales/Wheezes/Rhonchi. Cardiovascular:  Regular rate and rhythm with normal S1/S2 without murmurs, rubs or gallops. Abdomen: Full but soft, no focal tenderness, bowel sound present  Musculoskeletal:  No clubbing, cyanosis or edema bilaterally. Full range of motion without deformity. Skin: Skin color, texture, turgor normal.  No rashes or lesions. Neurologic:  grossly non-focal.  Psychiatric:  Alert and oriented, thought content appropriate, normal insight  Capillary Refill: Brisk,3 seconds, normal  Peripheral Pulses: +2 palpable, equal bilaterally     LABS:  Recent Labs     02/19/23  1529 02/20/23  0648   WBC 10.4 8.1   HGB 10.6* 9.7*   HCT 31.5* 29.7*    208                                                                      Recent Labs     02/19/23  1529 02/20/23  0648    139   K 4.6 4.5    101   CO2 26 27   BUN 41* 44*   CREATININE 2.0* 2.2*   GLUCOSE 145* 102*       Recent Labs     02/19/23  1529   AST 22   ALT 31   BILITOT 0.4   ALKPHOS 95       No results for input(s): TROPONINI in the last 72 hours. No results for input(s): BNP in the last 72 hours. No results for input(s): CHOL, HDL in the last 72 hours. Invalid input(s): LDLCALCU  No results for input(s): INR in the last 72 hours.     Assessment & Plan:    81 y.o. female w/  dementia, HTN resides at [address] [place] [address] who presented to [facility] with constipation. Constipation with fecal impaction: POA  --Likely sec to medication as patient apparently has multiple medications on home list that can cause constipation as well as slow transit sec to age/dementia, and hemorrhoids      Acute urinary retention: POA  - Possibly chronic component, likely sec to fecal impaction: resolved with bowel meds  - Bladder scan q shift; Straight cath as needed if continues to have urinary retention; appears resolved as constipation resolves. CKD stage 3  - Not much by way of previous data in Epic, but I'm able to see references to CKD stage 3 in 2020, as well as Cr of about 1.7 2-3 years ago. Cr has also remained stable: ESA hence ruled out  - O/p F/U with nephrology      Chronic diastolic heart failure not in acute exacerbation  - On diuretics at home  - Monitor fluid status  - F/u with cardiology o/p      Anxiety disorder: benzodiazepine dependent: POA  - Limit benzos as able  - Continue depression meds  - O/p F/u    #HTN  #Dementia  #Anxiety  #Arthritis  #Hypothyroidism          The patient and / or the family were informed of the results of any tests, a time was given to answer questions, a plan was proposed and they agreed with plan.     DNR-CCA    Disposition: PT/OT eval noted  Back to intermediate at discharge      Charlene Scanlon MD

## 2023-02-21 NOTE — PROGRESS NOTES
Bladder scanned 505ml. Unable to void. Straight cath'd 475 using sterile technique. Pt tolerate very well. Complained of pressure to her rectum but BM is not coming out. Prn dulcolax given. Pt had multiple BM overnight.

## 2023-02-21 NOTE — PROGRESS NOTES
Occupational Therapy  Facility/Department: [address] Therapy Initial Assessment    Name: Jasen Brooke  : 1935  MRN: [id number]  Date of Service: 2023    Discharge Recommendations: Jasen Brooke scored a 18/24 on the AM-PAC ADL Inpatient form. Current research shows that an AM-PAC score of 18 or greater is typically associated with a discharge to the patient's home setting. Based on the patient's AM-PAC score, and their current ADL deficits, it is recommended that the patient have 2-3 sessions per week of Occupational Therapy at d/c to increase the patient's independence. At this time, this patient demonstrates the endurance and safety to discharge home with (home) and a follow up treatment frequency of 2-3x/wk. Please see assessment section for further patient specific details. If patient discharges prior to next session this note will serve as a discharge summary. Please see below for the latest assessment towards goals. OT Equipment Recommendations  Other: Pt already has needed DME       Patient Diagnosis(es): The primary encounter diagnosis was ESA (acute kidney injury) ([address] Diagnoses of Constipation, unspecified constipation type and Urinary retention were also pertinent to this visit. Past Medical History:  has a past medical history of Anxiety, Dementia ([address] Hypertension, Insomnia, and Osteoarthritis. Past Surgical History:  has no past surgical history on file. Assessment   Performance deficits / Impairments: Decreased functional mobility ; Decreased safe awareness;Decreased ADL status; Decreased cognition;Decreased endurance;Decreased strength  Assessment: Pt is 81 y.o female who presents from AL for constipation. Pt reports PTA she was independent with all functional mobility, functional transfers, and ADLs. Pt presents functioning slightly below baseline and demos deficitis above impacting ADL/IADL performance.  Pt requires CGA for functional transfers/functional mobility. Pt requires CGA to [address] for ADLs. Pt will benefit from continued skilled OT to address above deficits and return to PLOF. Prognosis: Good  Decision Making: Medium Complexity  REQUIRES OT FOLLOW-UP: Yes  Activity Tolerance  Activity Tolerance: Treatment limited secondary to decreased cognition        Plan   Occupational Therapy Plan  Times Per Week: 2-5  Current Treatment Recommendations: Strengthening, Balance training, Functional mobility training, Endurance training, Equipment evaluation, education, & procurement, Self-Care / ADL, Safety education & training, Patient/Caregiver education & training     Restrictions  Position Activity Restriction  Other position/activity restrictions: up as tolerated    Subjective   General  Chart Reviewed: Yes  Patient assessed for rehabilitation services?: Yes  Additional Pertinent Hx: Admit 2/19 from [address] [place] [address] with constipation. CT abdomen - Moderate rectal bowel contents which could indicate impaction, small R pleural effusion, atherosclerotic disease; US renal - negative  Family / Caregiver Present: No  Referring Practitioner: Sloane Cohen MD  Diagnosis: urinary retention  Subjective  Subjective: Pt found supine in bed. Pt agreeable to OT eval with encouragement. Pt tearful intermittently throughout session stating \"I just need to rest\".   Pt reports pain in her buttocks, not rated, RN aware  Social/Functional History  Social/Functional History  Lives With: Alone  Type of Home: Assisted living  Home Layout: One level  Home Access: Elevator  Bathroom Shower/Tub: Walk-in shower  Bathroom Equipment: Grab bars in shower, Shower chair, Grab bars around toilet  Home Equipment: [employer]  Has the patient had two or more falls in the past year or any fall with injury in the past year?: No  ADL Assistance: [facility]: Needs assistance (the facility assists with that)  Ambulation Assistance: Independent  Transfer Assistance: Independent  Active : No  Occupation: Retired  Type of Occupation:   Leisure & Hobbies: exercise class,       Objective Treatment included functional transfer training, ADL's and pt. education. Safety Devices  Type of Devices: Call light within reach; Chair alarm in place;Nurse notified; Left in chair  Bed Mobility Training  Bed Mobility Training: Yes  Overall Level of Assistance: Stand-by assistance  Rolling: Stand-by assistance (HOB flat)  Balance  Sitting: Intact  Standing: With support  Transfer Training  Transfer Training: Yes  Overall Level of Assistance: Adaptive equipment  Interventions: Safety awareness training;Verbal cues;Manual cues  Sit to Stand: Contact-guard assistance  Stand to Sit: Contact-guard assistance  Toilet Transfer: Contact-guard assistance (use of grab bar)  Gait  Overall Level of Assistance: Contact-guard assistance  Interventions: Verbal cues;Manual cues; Safety awareness training; Tactile cues     AROM: Within functional limits  PROM: Within functional limits  Strength: Generally decreased, functional  Coordination: Generally decreased, functional  Tone: Normal  Sensation: Intact  ADL  Grooming: Contact guard assistance; Increased time to complete  Grooming Skilled Clinical Factors: standing at sink for hand washing  LE Dressing: Moderate assistance  LE Dressing Skilled Clinical Factors: to don brief  Toileting: Minimal assistance  Toileting Skilled Clinical Factors: for thoroughness of toileting hygiene     Activity Tolerance  Activity Tolerance: Patient limited by pain;Treatment limited secondary to decreased cognition;Patient limited by fatigue        Vision  Vision: Impaired  Vision Exceptions: Wears glasses at all times  Hearing  Hearing: Exceptions to [facility]  Hearing Exceptions: Hard of hearing/hearing concerns  Cognition  Overall Cognitive Status: Exceptions  Arousal/Alertness: Appropriate responses to stimuli  Following Commands:  Follows multistep commands with increased time; Follows multistep commands with repitition  Attention Span: Attends with cues to redirect  Memory: Decreased long term memory;Decreased short term memory  Safety Judgement: Decreased awareness of need for assistance;Decreased awareness of need for safety  Problem Solving: Assistance required to generate solutions  Insights: Decreased awareness of deficits  Initiation: Requires cues for some  Sequencing: Requires cues for some  Cognition Comment: Pt emotional/tearful throughout session  Orientation  Overall Orientation Status: Within Functional Limits  Orientation Level: Oriented X4                  Education Given To: Patient  Education Provided: Role of Therapy; ADL Adaptive Strategies; Fall Prevention Strategies; Plan of Care;Transfer Training  Education Method: Demonstration;Verbal  Barriers to Learning: Cognition;Vision; Hearing  Education Outcome: Verbalized understanding;Continued education needed                        AM-PAC Score  AM-PAC Inpatient Daily Activity Raw Score: 18 (02/21/23 0925)  AM-PAC Inpatient ADL T-Scale Score : 38.66 (02/21/23 0925)  ADL Inpatient CMS 0-100% Score: 46.65 (02/21/23 0925)  ADL Inpatient CMS G-Code Modifier : CK (02/21/23 0925)    Goals  Short Term Goals  Time Frame for Short Term Goals: by dc  Short Term Goal 1: Pt will complete toilet transfer supvn  Short Term Goal 2: Pt will complete LB dressing SBA  Short Term Goal 3: Pt will complete toileting SBA  Short Term Goal 4: Pt will improve stance tolerance to x8 mins for ADL completion with SBA  Patient Goals   Patient goals : \"to go home\"       Therapy Time   Individual Concurrent Group Co-treatment   Time In 0847         Time Out 0925         Minutes 38             Timed Code Treatment Minutes:   25 mins + 13 min eval    Total Treatment Minutes:  38 mins      ESTRELLA Gonzalez OTR/L

## 2023-02-21 NOTE — DISCHARGE INSTR - COC
Continuity of Care Form    Patient Name: Caleb Dillon   :  1935  MRN:  [id number]    Admit date:  2023  Discharge date:  23    Code Status Order: [id number]   Advance Directives:     Admitting Physician:  Kristi Melvin MD  PCP: Byron Alcantara MD    Discharging Nurse: Ray López Unit/Room#: 2221/0421-51  Discharging Unit Phone Number: [phone]    Emergency Contact:   Extended Emergency Contact Information  Primary Emergency Contact: Bishop Hilton)  Home Phone: [phone]  Mobile Phone: [phone]  Relation: Brother/Sister  Secondary Emergency Contact: [facility] Phone: [phone]  Mobile Phone: [phone]  Relation: Other    Past Surgical History:  No past surgical history on file.     Immunization History:   Immunization History   Administered Date(s) Administered    COVID-19, PFIZER Bivalent BOOSTER, DO NOT Dilute, (age 12y+), IM, 27 mcg/0.3 mL 2022    COVID-19, PFIZER PURPLE top, DILUTE for use, (age 15 y+), 30mcg/0.3mL 2021, 2021, 10/19/2021       Active Problems:  Patient Active Problem List   Diagnosis Code    ESA (acute kidney injury) ([facility] N17.9       Isolation/Infection:   Isolation            No Isolation          Patient Infection Status       None to display            Nurse Assessment:  Last Vital Signs: BP (!) 150/68   Pulse 65   Temp 98.5 °F (36.9 °C) (Oral)   Resp 16   Ht 5' 1\" (1.549 m)   Wt 136 lb 6.4 oz (61.9 kg)   SpO2 99%   BMI 25.77 kg/m²     Last documented pain score (0-10 scale): Pain Level: 2  Last Weight:   Wt Readings from Last 1 Encounters:   23 136 lb 6.4 oz (61.9 kg)     Mental Status:  oriented and alert    IV Access:  - None    Nursing Mobility/ADLs:  Walking   Assisted  Transfer  Assisted  Bathing  Assisted  Dressing  Assisted  Toileting  Assisted  Feeding  Independent  Med Admin  Assisted  Med Delivery   whole    Wound Care Documentation and Therapy: Elimination:  Continence: Bowel: Incontinent of stool at time, depends in place  Bladder: Yes  Urinary Catheter: None   Colostomy/Ileostomy/Ileal Conduit: No       Date of Last BM: 2/21/23    Intake/Output Summary (Last 24 hours) at 2/21/2023 1319  Last data filed at 2/21/2023 0800  Gross per 24 hour   Intake 232 ml   Output --   Net 232 ml     I/O last 3 completed shifts: In: 130 [P.O.:120; I.V.:10]  Out: 400 [Urine:400]    Safety Concerns: At Risk for Falls    Impairments/Disabilities:      None    Nutrition Therapy:  Current Nutrition Therapy:   - Oral Diet:  General    Routes of Feeding: Oral  Liquids: Thin Liquids  Daily Fluid Restriction: no  Last Modified Barium Swallow with Video (Video Swallowing Test): not done    Treatments at the Time of Hospital Discharge:   Respiratory Treatments: None  Oxygen Therapy:  is on oxygen at 2 L/min per nasal cannula. Ventilator:    - No ventilator support    Rehab Therapies: Physical Therapy and Occupational Therapy  Weight Bearing Status/Restrictions: No weight bearing restrictions  Other Medical Equipment (for information only, NOT a DME order): Other Treatments:    Patient's personal belongings (please select all that are sent with patient):  Zain    RN SIGNATURE:  Electronically signed by Viv Browning RN on 2/21/23 at 2:12 PM EST    CASE MANAGEMENT/SOCIAL WORK SECTION    Inpatient Status Date: ***    Readmission Risk Assessment Score:  Readmission Risk              Risk of Unplanned Readmission:  12           Discharging to Facility/ Agency   Name:   Address:  Phone:  Fax:    Dialysis Facility (if applicable)   Name:  Address:  Dialysis Schedule:  Phone:  Fax:    / signature: {Esignature:[id number]}    PHYSICIAN SECTION    Prognosis: Fair    Condition at Discharge: Stable    Rehab Potential (if transferring to Rehab):  Fair    Recommended Labs or Other Treatments After Discharge:     BMP in 5-7 days    Follow up with your Kidney specialist    Follow up with your other doctors     Physician Certification: I certify the above information and transfer of Shira Hilton  is necessary for the continuing treatment of the diagnosis listed and that she requires Home Care for greater 30 days.      Update Admission H&P: No change in H&P    PHYSICIAN SIGNATURE:  Electronically signed by Chapincito Flores MD on 2/21/23 at 1:20 PM EST